# Patient Record
Sex: FEMALE
[De-identification: names, ages, dates, MRNs, and addresses within clinical notes are randomized per-mention and may not be internally consistent; named-entity substitution may affect disease eponyms.]

---

## 2023-10-19 PROBLEM — Z00.00 ENCOUNTER FOR PREVENTIVE HEALTH EXAMINATION: Status: ACTIVE | Noted: 2023-10-19

## 2023-10-26 ENCOUNTER — APPOINTMENT (OUTPATIENT)
Dept: ORTHOPEDIC SURGERY | Facility: CLINIC | Age: 58
End: 2023-10-26
Payer: MEDICAID

## 2023-10-26 VITALS
WEIGHT: 165 LBS | HEART RATE: 84 BPM | SYSTOLIC BLOOD PRESSURE: 145 MMHG | BODY MASS INDEX: 26.52 KG/M2 | OXYGEN SATURATION: 98 % | DIASTOLIC BLOOD PRESSURE: 89 MMHG | HEIGHT: 66 IN

## 2023-10-26 DIAGNOSIS — Z86.39 PERSONAL HISTORY OF OTHER ENDOCRINE, NUTRITIONAL AND METABOLIC DISEASE: ICD-10-CM

## 2023-10-26 DIAGNOSIS — Z72.3 LACK OF PHYSICAL EXERCISE: ICD-10-CM

## 2023-10-26 DIAGNOSIS — Z78.9 OTHER SPECIFIED HEALTH STATUS: ICD-10-CM

## 2023-10-26 DIAGNOSIS — Z87.39 PERSONAL HISTORY OF OTHER DISEASES OF THE MUSCULOSKELETAL SYSTEM AND CONNECTIVE TISSUE: ICD-10-CM

## 2023-10-26 PROCEDURE — 72020 X-RAY EXAM OF SPINE 1 VIEW: CPT

## 2023-10-26 PROCEDURE — 73560 X-RAY EXAM OF KNEE 1 OR 2: CPT | Mod: 50

## 2023-10-26 PROCEDURE — 20610 DRAIN/INJ JOINT/BURSA W/O US: CPT | Mod: 50

## 2023-10-26 PROCEDURE — 73564 X-RAY EXAM KNEE 4 OR MORE: CPT | Mod: 50

## 2023-10-26 PROCEDURE — 99204 OFFICE O/P NEW MOD 45 MIN: CPT | Mod: 25

## 2023-10-26 PROCEDURE — 73030 X-RAY EXAM OF SHOULDER: CPT | Mod: 50

## 2023-10-28 LAB
ERYTHROCYTE [SEDIMENTATION RATE] IN BLOOD BY WESTERGREN METHOD: 16 MM/HR
RHEUMATOID FACT SER QL: <10 IU/ML

## 2023-10-31 LAB
ANA PAT FLD IF-IMP: ABNORMAL
ANA SER IF-ACNC: ABNORMAL

## 2023-11-30 ENCOUNTER — APPOINTMENT (OUTPATIENT)
Dept: ORTHOPEDIC SURGERY | Facility: CLINIC | Age: 58
End: 2023-11-30
Payer: MEDICAID

## 2023-11-30 DIAGNOSIS — M54.12 RADICULOPATHY, CERVICAL REGION: ICD-10-CM

## 2023-11-30 PROCEDURE — 99213 OFFICE O/P EST LOW 20 MIN: CPT

## 2023-12-28 ENCOUNTER — APPOINTMENT (OUTPATIENT)
Dept: ORTHOPEDIC SURGERY | Facility: CLINIC | Age: 58
End: 2023-12-28
Payer: MEDICAID

## 2023-12-28 DIAGNOSIS — M19.012 PRIMARY OSTEOARTHRITIS, RIGHT SHOULDER: ICD-10-CM

## 2023-12-28 DIAGNOSIS — M19.011 PRIMARY OSTEOARTHRITIS, RIGHT SHOULDER: ICD-10-CM

## 2023-12-28 PROCEDURE — 99213 OFFICE O/P EST LOW 20 MIN: CPT

## 2023-12-28 NOTE — PHYSICAL EXAM
[de-identified] : MRI is read today by me and clkearly shows 2-3 tendon massive rotator cugfftears with head elevation and atrophy. These are unreapiarable tears with some cuff arthropathy.

## 2023-12-28 NOTE — DISCUSSION/SUMMARY
[de-identified] : We had a long discussion with the patient through her nephew .  SHe ahs unreapirable RC and Shoulder OA . She needs RTSA as surgiocal option. HSe is scared of surgery and would like to avoid it.  I gave her one more cortisone injection as this is the only injectable Medicaid will cover.  I have also impressed upon her with her multiple joint arthralgias and OA throughout and positive MELVIN she may have Lupus nd should see our Rheumatologist to consider non surgical pharmacologic coverage for her pain and discomfort.  F/U prn

## 2023-12-28 NOTE — HISTORY OF PRESENT ILLNESS
[de-identified] : Ms Smith returns kness are aching but improved. Right shoulder had done well after injection but now pain has returned. SHe never saw the Rheumatologist yet and as we know she has a positive MELVIN with multiple joint arthralgias and so I would like to expedite that.  MRI today is reviewed.

## 2024-01-30 ENCOUNTER — APPOINTMENT (OUTPATIENT)
Dept: ORTHOPEDIC SURGERY | Facility: CLINIC | Age: 59
End: 2024-01-30
Payer: MEDICAID

## 2024-01-30 VITALS — WEIGHT: 165 LBS | HEIGHT: 66 IN | BODY MASS INDEX: 26.52 KG/M2

## 2024-01-30 DIAGNOSIS — M75.101 UNSPECIFIED ROTATOR CUFF TEAR OR RUPTURE OF RIGHT SHOULDER, NOT SPECIFIED AS TRAUMATIC: ICD-10-CM

## 2024-01-30 PROCEDURE — 99204 OFFICE O/P NEW MOD 45 MIN: CPT

## 2024-01-30 RX ORDER — MELOXICAM 15 MG/1
15 TABLET ORAL DAILY
Qty: 30 | Refills: 2 | Status: DISCONTINUED | COMMUNITY
Start: 2023-10-26 | End: 2024-01-30

## 2024-03-01 ENCOUNTER — LABORATORY RESULT (OUTPATIENT)
Age: 59
End: 2024-03-01

## 2024-03-01 ENCOUNTER — APPOINTMENT (OUTPATIENT)
Dept: RHEUMATOLOGY | Facility: CLINIC | Age: 59
End: 2024-03-01
Payer: MEDICAID

## 2024-03-01 VITALS
OXYGEN SATURATION: 96 % | HEART RATE: 60 BPM | BODY MASS INDEX: 27 KG/M2 | SYSTOLIC BLOOD PRESSURE: 132 MMHG | WEIGHT: 168 LBS | TEMPERATURE: 97.9 F | HEIGHT: 66 IN | DIASTOLIC BLOOD PRESSURE: 69 MMHG

## 2024-03-01 DIAGNOSIS — M17.0 BILATERAL PRIMARY OSTEOARTHRITIS OF KNEE: ICD-10-CM

## 2024-03-01 DIAGNOSIS — M32.9 SYSTEMIC LUPUS ERYTHEMATOSUS, UNSPECIFIED: ICD-10-CM

## 2024-03-01 DIAGNOSIS — M47.812 SPONDYLOSIS W/OUT MYELOPATHY OR RADICULOPATHY, CERVICAL REGION: ICD-10-CM

## 2024-03-01 DIAGNOSIS — M71.40 CALCIUM DEPOSIT IN BURSA, UNSPECIFIED SITE: ICD-10-CM

## 2024-03-01 DIAGNOSIS — M75.121 COMPLETE ROTATOR CUFF TEAR OR RUPTURE OF RIGHT SHOULDER, NOT SPECIFIED AS TRAUMATIC: ICD-10-CM

## 2024-03-01 PROCEDURE — 99204 OFFICE O/P NEW MOD 45 MIN: CPT | Mod: 25

## 2024-03-03 LAB
ALBUMIN SERPL ELPH-MCNC: 4.8 G/DL
ALP BLD-CCNC: 70 U/L
ALT SERPL-CCNC: 13 U/L
ANION GAP SERPL CALC-SCNC: 13 MMOL/L
AST SERPL-CCNC: 15 U/L
BASOPHILS # BLD AUTO: 0.03 K/UL
BASOPHILS NFR BLD AUTO: 0.5 %
BILIRUB SERPL-MCNC: 0.2 MG/DL
BUN SERPL-MCNC: 16 MG/DL
CALCIUM SERPL-MCNC: 10 MG/DL
CALCIUM SERPL-MCNC: 10 MG/DL
CCP AB SER IA-ACNC: <8 UNITS
CHLORIDE SERPL-SCNC: 101 MMOL/L
CO2 SERPL-SCNC: 27 MMOL/L
CREAT SERPL-MCNC: 0.61 MG/DL
EGFR: 103 ML/MIN/1.73M2
EOSINOPHIL # BLD AUTO: 0.07 K/UL
EOSINOPHIL NFR BLD AUTO: 1.1 %
GLUCOSE SERPL-MCNC: 104 MG/DL
HCT VFR BLD CALC: 37.4 %
HGB BLD-MCNC: 12 G/DL
IMM GRANULOCYTES NFR BLD AUTO: 0.2 %
LYMPHOCYTES # BLD AUTO: 2.3 K/UL
LYMPHOCYTES NFR BLD AUTO: 37.7 %
MAGNESIUM SERPL-MCNC: 2.2 MG/DL
MAN DIFF?: NORMAL
MCHC RBC-ENTMCNC: 28 PG
MCHC RBC-ENTMCNC: 32.1 GM/DL
MCV RBC AUTO: 87.4 FL
MONOCYTES # BLD AUTO: 0.61 K/UL
MONOCYTES NFR BLD AUTO: 10 %
NEUTROPHILS # BLD AUTO: 3.08 K/UL
NEUTROPHILS NFR BLD AUTO: 50.5 %
PARATHYROID HORMONE INTACT: 61 PG/ML
PHOSPHATE SERPL-MCNC: 3.3 MG/DL
PLATELET # BLD AUTO: 279 K/UL
POTASSIUM SERPL-SCNC: 4.5 MMOL/L
PROT SERPL-MCNC: 7.3 G/DL
RBC # BLD: 4.28 M/UL
RBC # FLD: 13.2 %
RF+CCP IGG SER-IMP: NEGATIVE
SODIUM SERPL-SCNC: 141 MMOL/L
WBC # FLD AUTO: 6.1 K/UL

## 2024-03-04 NOTE — PHYSICAL EXAM
[General Appearance - Alert] : alert [General Appearance - In No Acute Distress] : in no acute distress [General Appearance - Well Nourished] : well nourished [General Appearance - Well Developed] : well developed [General Appearance - Well-Appearing] : healthy appearing [Sclera] : the sclera and conjunctiva were normal [Respiration, Rhythm And Depth] : normal respiratory rhythm and effort [Exaggerated Use Of Accessory Muscles For Inspiration] : no accessory muscle use [Edema] : there was no peripheral edema [Abnormal Walk] : normal gait [Nail Clubbing] : no clubbing  or cyanosis of the fingernails [Musculoskeletal - Swelling] : no joint swelling seen [] : no rash [Oriented To Time, Place, And Person] : oriented to person, place, and time [Impaired Insight] : insight and judgment were intact [Affect] : the affect was normal [Examination Of The Oral Cavity] : the lips and gums were normal [Auscultation Breath Sounds / Voice Sounds] : lungs were clear to auscultation bilaterally [FreeTextEntry1] : Mild decrease in ROM of the right shoulder.  Tenderness over the right lateral thigh with preserved ROM.  Negative straight leg raises bilaterally.  Good handgrip bilaterally.  No active synovitis note.

## 2024-03-04 NOTE — ASSESSMENT
[FreeTextEntry1] : 58 year old woman referred for rheumatology evaluation. Patient reports history of arthritis with joint pains for many years. At this time patient with pain in hands, knees, and right lateral thigh.  Patient with injury in right shoulder post fall, evaluated orthopedics with MRI of the right shoulder with RTC tear for the supraspinatus and infraspinatus tendons, although with preserved function and minimal pain.  Patient evaluated by shoulder specialist and recommended to continue with conservative management at this time without plans for surgery.  Patient previously started physical therapy though has discontinued currently due to lack of time. Patient received injection in right shoulder and knee with some benefit at that time though pain has returned. To manage pain, patient takes Tylenol daily with benefit and as well as icy cold rollers. Reviewed previous MRI shoulder reviewed, x-ray hips 2/27 reviewed with calcium deposit in soft tissue of right hip, awaiting results from further imaging of the MRI of the lumbar spine. Patient will follow up imaging with orthopedist as well.  Patient noted to have positive MELVIN on recent blood tests, at this time, patient does not meet criteria for an underlying connective tissue disease such as SLE, will repeat MELVIN with reflex to serologies today.  given calcinosis, will also check labs including PTH, calcium, magnesium and phosphorus today as well in addition HLAB27 and anti CCP. Further management pending results.

## 2024-03-04 NOTE — ADDENDUM
[FreeTextEntry1] : I, Shakeel Perea, documented this note as a scribe on behalf of Dr. Ebonie Torres MD on 03/01/2024.

## 2024-03-04 NOTE — REVIEW OF SYSTEMS
[Negative] : Heme/Lymph [Joint Pain] : joint pain [FreeTextEntry9] : pain in right shoulder, hands, knees, right hip

## 2024-03-04 NOTE — HISTORY OF PRESENT ILLNESS
[FreeTextEntry1] : March 1, 2024 58 year old woman referred for initial evaluation. Referred by Dr. Lindo in orthopedics Accompanied by daughter who is translating as per patient's request Patient reporys history of arthritis Reports pain in body, mostly joints for a few years Reports pain with , predominantly right hand Pain with extension of left knee Pain in back of neck  Patient with pain in right shoulder post fall, eval by Dr. Flores, no plans for surgery at this time Takes Tylenol four times a day with some benefit Uses ice cold gel rollers with some benefit Received injection in shoulders and knee from Dr. Lindo, felt relief at that time but pain returned Started PT but discontinued due to having no time Reports previous injections in hip as well No known drug allergies  Reviewed MRI shoulder MRI on 2/27, awaiting results X-rays of hips two days ago; of note calcium deposit in soft tissue of right hip No other medical issues No known history of SLE Patient recently came to the [place]

## 2024-03-04 NOTE — END OF VISIT
[Time Spent: ___ minutes] : I have spent [unfilled] minutes of time on the encounter. [FreeTextEntry3] : All medical record entries made by the Scribe were at my, Dr. Ebonie Torres MD, direction and personally dictated by me on 03/01/2024. I have reviewed the chart and agree that the record accurately reflects my personal performance of the history, physical exam, assessment and plan. I have also personally directed, reviewed, and agreed with the chart.

## 2024-03-04 NOTE — DATA REVIEWED
[FreeTextEntry1] : 	 EXAM:  MRI RIGHT SHOULDER WITHOUT CONTRAST  HISTORY:  58-year-old female, pain and decreased range of motion.  TECHNIQUE:  An MRI examination of the right shoulder was performed using oblique coronal proton density and T2 fat suppression, oblique sagittal T1 and  FSE T2 fat suppression, and axial T2 fat suppression sequences.   FINDINGS:    Bones The osseous structures comprising the right shoulder demonstrate normal marrow signal.   AC joint There is mild arthrosis in the acromioclavicular joint.  Rotator cuff There is a massive chronic-appearing full-thickness tear of the rotator cuff involving the supraspinatus and infraspinatus tendons in their entirety, and also extending to involve much of the subscapularis tendon attachment. The supraspinatus and infraspinatus tendons are torn off of the humerus, and markedly retracted to the level of the glenoid. There is at least moderate fatty atrophy of both corresponding muscles. There is a large delaminating articular sided partial tear of the subscapularis tendon, extending into the transverse ligament which covers the bicipital groove. The upper half of the subscapularis muscle is replaced with fatty atrophy as well. The teres minor tendon remains intact.  Long biceps The long biceps tendon is dislocated out of the bicipital groove, tracking anterior to the lesser tuberosity. There is high-grade partial tearing of the proximal (horizontal) component of the long biceps, but a few thin strands of tendon appear to remain anchored onto the superior glenoid.  Glenohumeral joint, labrum, capsule The humeral head is high riding, nearly coming contact with the undersurface of the acromion (which appears eroded and remodeled). There is moderate degenerative arthrosis in the glenohumeral joint, with thinning and irregularity of articular cartilage, marginal spur development on the humerus. There is circumferential attenuation, degeneration of and fraying of the glenoid labrum. A small glenohumeral joint effusion is present, communicating freely with additional fluid in the overlying bursa due to the massive rotator cuff defect. There is no adhesive capsulitis.  IMPRESSION:   1. Mild AC joint arthrosis. 2. Massive chronic-appearing full-thickness tear of the rotator cuff involving the supraspinatus and infraspinatus tendons in their entirety, and a large portion of the subscapularis tendon as well. There is moderate to severe fatty atrophy in the supraspinatus and infraspinatus muscles, as well as the upper half of the subscapularis muscle. 3. The transverse ligament is ruptured, and the long biceps tendon is dislocated out of the humeral bicipital groove; there is high-grade partial tearing of the proximal (horizontal) portion of the long biceps tendon. 4. High riding humeral head, moderate glenohumeral arthrosis, with degeneration/fraying of much of the glenoid labrum.   Thank you for the opportunity to participate in the care of this patient.     EVE RUBIN MD  - Electronically Signed: 12- 11:45 AM  Physician to Physician Direct Line is: [phone]  	 EXAM:  X-RAY BILATERAL HIPS 3-4 VIEWS  HISTORY:  Bilateral hip pain.  FINDINGS:    Intact symmetric hip joint spaces. Intact pubic symphysis and SI joints.  Normal bony mineralization.  There are chronic appearing foci soft tissue callus cages lateral to the right greater trochanter. Clinical correlation for possible symptoms of right trochanteric calcific tendinitis/bursitis.  IMPRESSION: As above.  Thank you for the opportunity to participate in the care of this patient.     RAJEEV ESPINOSA MD  - Electronically Signed: 02- 12:21 PM  Physician to Physician Direct Line is: [phone]

## 2024-03-07 LAB — HLA-B27 QL NAA+PROBE: NORMAL

## 2024-03-18 LAB
ANA PAT FLD IF-IMP: ABNORMAL
ANACR T: ABNORMAL

## 2024-03-22 ENCOUNTER — APPOINTMENT (OUTPATIENT)
Dept: RHEUMATOLOGY | Facility: CLINIC | Age: 59
End: 2024-03-22
Payer: MEDICAID

## 2024-03-22 DIAGNOSIS — R76.8 OTHER SPECIFIED ABNORMAL IMMUNOLOGICAL FINDINGS IN SERUM: ICD-10-CM

## 2024-03-22 DIAGNOSIS — M25.50 PAIN IN UNSPECIFIED JOINT: ICD-10-CM

## 2024-03-22 PROCEDURE — 99442: CPT

## 2024-03-24 PROBLEM — R76.8 ANA POSITIVE: Status: ACTIVE | Noted: 2024-03-01

## 2024-03-24 PROBLEM — M25.50 ARTHRALGIA OF MULTIPLE JOINTS: Status: ACTIVE | Noted: 2023-10-26

## 2024-03-24 NOTE — ASSESSMENT
[FreeTextEntry1] : 58 year old woman returns for rheumatology follow up via telephonic visit. Patient reports history of arthritis with joint pains for many years.  Patient with injury in right shoulder post fall, evaluated orthopedics with MRI of the right shoulder with RTC tear for the supraspinatus and infraspinatus tendons, although with preserved function and minimal pain.  Patient evaluated by shoulder specialist and recommended to continue with conservative management at this time without plans for surgery.  Patient previously started physical therapy though has discontinued currently due to lack of time. Patient received injection in right shoulder and knee with some benefit at that time though pain has returned. To manage pain, patient takes Tylenol daily with benefit and as well as icy cold rollers. Reviewed previous MRI shoulder as well as x-ray hips 2/27 reviewed with calcium deposit in soft tissue of right hip, awaiting results from further imaging of the MRI of the lumbar spine (not available to me at this time). Patient will follow up imaging with orthopedist as well.  Patient noted to have positive MELVIN on recent blood tests, at this time, patient does not meet criteria for an underlying connective tissue disease, repeat labs noted low positive anti centromere antibody without clinical criteria for CREST syndrome. Patient will follow up with orthopedist, will continue to monitor, follow up with rheumatology in 4-6 months or sooner as needed.

## 2024-03-24 NOTE — DATA REVIEWED
[FreeTextEntry1] : 	 EXAM:  MRI RIGHT SHOULDER WITHOUT CONTRAST  HISTORY:  58-year-old female, pain and decreased range of motion.  TECHNIQUE:  An MRI examination of the right shoulder was performed using oblique coronal proton density and T2 fat suppression, oblique sagittal T1 and  FSE T2 fat suppression, and axial T2 fat suppression sequences.   FINDINGS:    Bones The osseous structures comprising the right shoulder demonstrate normal marrow signal.   AC joint There is mild arthrosis in the acromioclavicular joint.  Rotator cuff There is a massive chronic-appearing full-thickness tear of the rotator cuff involving the supraspinatus and infraspinatus tendons in their entirety, and also extending to involve much of the subscapularis tendon attachment. The supraspinatus and infraspinatus tendons are torn off of the humerus, and markedly retracted to the level of the glenoid. There is at least moderate fatty atrophy of both corresponding muscles. There is a large delaminating articular sided partial tear of the subscapularis tendon, extending into the transverse ligament which covers the bicipital groove. The upper half of the subscapularis muscle is replaced with fatty atrophy as well. The teres minor tendon remains intact.  Long biceps The long biceps tendon is dislocated out of the bicipital groove, tracking anterior to the lesser tuberosity. There is high-grade partial tearing of the proximal (horizontal) component of the long biceps, but a few thin strands of tendon appear to remain anchored onto the superior glenoid.  Glenohumeral joint, labrum, capsule The humeral head is high riding, nearly coming contact with the undersurface of the acromion (which appears eroded and remodeled). There is moderate degenerative arthrosis in the glenohumeral joint, with thinning and irregularity of articular cartilage, marginal spur development on the humerus. There is circumferential attenuation, degeneration of and fraying of the glenoid labrum. A small glenohumeral joint effusion is present, communicating freely with additional fluid in the overlying bursa due to the massive rotator cuff defect. There is no adhesive capsulitis.  IMPRESSION:   1. Mild AC joint arthrosis. 2. Massive chronic-appearing full-thickness tear of the rotator cuff involving the supraspinatus and infraspinatus tendons in their entirety, and a large portion of the subscapularis tendon as well. There is moderate to severe fatty atrophy in the supraspinatus and infraspinatus muscles, as well as the upper half of the subscapularis muscle. 3. The transverse ligament is ruptured, and the long biceps tendon is dislocated out of the humeral bicipital groove; there is high-grade partial tearing of the proximal (horizontal) portion of the long biceps tendon. 4. High riding humeral head, moderate glenohumeral arthrosis, with degeneration/fraying of much of the glenoid labrum.   Thank you for the opportunity to participate in the care of this patient.     EVE RUBIN MD  - Electronically Signed: 12- 11:45 AM  Physician to Physician Direct Line is: [phone]  	 EXAM:  X-RAY BILATERAL HIPS 3-4 VIEWS  HISTORY:  Bilateral hip pain.  FINDINGS:    Intact symmetric hip joint spaces. Intact pubic symphysis and SI joints.  Normal bony mineralization.  There are chronic appearing foci soft tissue callus cages lateral to the right greater trochanter. Clinical correlation for possible symptoms of right trochanteric calcific tendinitis/bursitis.  IMPRESSION: As above.  Thank you for the opportunity to participate in the care of this patient.     RAJEEV ESPINOSA MD  - Electronically Signed: 02- 12:21 PM  Physician to Physician Direct Line is: [phone]

## 2024-03-24 NOTE — HISTORY OF PRESENT ILLNESS
[FreeTextEntry1] : March 22, 2024 Patient returns for follow up.  Visit translated by patient's daughter at patient request. Verbal consent given on 03/24/2024 at 14:52 by RUPALI PARRA for telehealth visit. [place] is located in [place], provider is located in [place].  Patient overall feeling well. Reviewed lab results with patient and daughter. No Raynaud's, no telangiectasias, no dysphagia.  March 1, 2024 58 year old woman referred for initial evaluation. Referred by Dr. Lindo in orthopedics Accompanied by daughter who is translating as per patient's request Patient reporys history of arthritis Reports pain in body, mostly joints for a few years Reports pain with , predominantly right hand Pain with extension of left knee Pain in back of neck  Patient with pain in right shoulder post fall, eval by Dr. Flores, no plans for surgery at this time Takes Tylenol four times a day with some benefit Uses ice cold gel rollers with some benefit Received injection in shoulders and knee from Dr. Lindo, felt relief at that time but pain returned Started PT but discontinued due to having no time Reports previous injections in hip as well No known drug allergies  Reviewed MRI shoulder MRI on 2/27, awaiting results X-rays of hips two days ago; of note calcium deposit in soft tissue of right hip No other medical issues No known history of SLE Patient recently came [place] the [place]

## 2024-10-30 ENCOUNTER — NON-APPOINTMENT (OUTPATIENT)
Age: 59
End: 2024-10-30

## 2024-10-30 ENCOUNTER — APPOINTMENT (OUTPATIENT)
Dept: RHEUMATOLOGY | Facility: CLINIC | Age: 59
End: 2024-10-30
Payer: MEDICAID

## 2024-10-30 VITALS
DIASTOLIC BLOOD PRESSURE: 76 MMHG | WEIGHT: 171 LBS | OXYGEN SATURATION: 94 % | TEMPERATURE: 97.3 F | HEART RATE: 77 BPM | SYSTOLIC BLOOD PRESSURE: 123 MMHG | BODY MASS INDEX: 27.48 KG/M2 | HEIGHT: 66 IN

## 2024-10-30 DIAGNOSIS — M25.50 PAIN IN UNSPECIFIED JOINT: ICD-10-CM

## 2024-10-30 DIAGNOSIS — M81.0 AGE-RELATED OSTEOPOROSIS W/OUT CURRENT PATHOLOGICAL FRACTURE: ICD-10-CM

## 2024-10-30 PROCEDURE — G2211 COMPLEX E/M VISIT ADD ON: CPT | Mod: NC

## 2024-10-30 PROCEDURE — 99213 OFFICE O/P EST LOW 20 MIN: CPT

## 2024-10-30 RX ORDER — CYCLOBENZAPRINE HCL
CRYSTALS MISCELLANEOUS
Refills: 0 | Status: ACTIVE | COMMUNITY

## 2024-10-30 RX ORDER — MELOXICAM 10 MG/1
CAPSULE ORAL
Refills: 0 | Status: ACTIVE | COMMUNITY

## 2024-10-31 PROBLEM — M81.0 OSTEOPOROSIS, UNSPECIFIED OSTEOPOROSIS TYPE, UNSPECIFIED PATHOLOGICAL FRACTURE PRESENCE: Status: ACTIVE | Noted: 2023-10-26

## 2024-10-31 LAB
25(OH)D3 SERPL-MCNC: 41.6 NG/ML
RHEUMATOID FACT SER QL: <10 IU/ML

## 2025-03-05 ENCOUNTER — NON-APPOINTMENT (OUTPATIENT)
Age: 60
End: 2025-03-05

## 2025-03-05 ENCOUNTER — APPOINTMENT (OUTPATIENT)
Dept: RHEUMATOLOGY | Facility: CLINIC | Age: 60
End: 2025-03-05
Payer: MEDICAID

## 2025-03-05 VITALS
DIASTOLIC BLOOD PRESSURE: 75 MMHG | HEART RATE: 69 BPM | HEIGHT: 66 IN | WEIGHT: 176 LBS | SYSTOLIC BLOOD PRESSURE: 127 MMHG | BODY MASS INDEX: 28.28 KG/M2 | TEMPERATURE: 97.7 F | OXYGEN SATURATION: 97 %

## 2025-03-05 DIAGNOSIS — M25.50 PAIN IN UNSPECIFIED JOINT: ICD-10-CM

## 2025-03-05 DIAGNOSIS — M81.0 AGE-RELATED OSTEOPOROSIS W/OUT CURRENT PATHOLOGICAL FRACTURE: ICD-10-CM

## 2025-03-05 PROCEDURE — 99214 OFFICE O/P EST MOD 30 MIN: CPT

## 2025-03-05 PROCEDURE — G2211 COMPLEX E/M VISIT ADD ON: CPT | Mod: NC

## 2025-03-05 RX ORDER — CALCIUM CARBONATE/VITAMIN D3 600 MG-10
600-10 TABLET ORAL DAILY
Qty: 90 | Refills: 3 | Status: ACTIVE | COMMUNITY
Start: 2025-03-05 | End: 1900-01-01

## 2025-03-05 RX ORDER — ALENDRONATE SODIUM 70 MG/1
70 TABLET ORAL
Qty: 13 | Refills: 3 | Status: ACTIVE | COMMUNITY
Start: 2025-03-05 | End: 1900-01-01

## 2025-06-04 ENCOUNTER — APPOINTMENT (OUTPATIENT)
Dept: RHEUMATOLOGY | Facility: CLINIC | Age: 60
End: 2025-06-04

## 2025-06-23 ENCOUNTER — APPOINTMENT (OUTPATIENT)
Dept: RHEUMATOLOGY | Facility: CLINIC | Age: 60
End: 2025-06-23
Payer: MEDICAID

## 2025-06-23 VITALS
SYSTOLIC BLOOD PRESSURE: 112 MMHG | BODY MASS INDEX: 27.64 KG/M2 | WEIGHT: 172 LBS | OXYGEN SATURATION: 93 % | HEIGHT: 66 IN | TEMPERATURE: 97.7 F | DIASTOLIC BLOOD PRESSURE: 62 MMHG | HEART RATE: 76 BPM

## 2025-06-23 PROCEDURE — 99214 OFFICE O/P EST MOD 30 MIN: CPT

## 2025-06-23 PROCEDURE — G2211 COMPLEX E/M VISIT ADD ON: CPT | Mod: NC

## 2025-06-23 RX ORDER — OMEPRAZOLE 40 MG/1
CAPSULE, DELAYED RELEASE ORAL
Refills: 0 | Status: ACTIVE | COMMUNITY

## 2025-06-24 LAB
25(OH)D3 SERPL-MCNC: 27.1 NG/ML
ALBUMIN SERPL ELPH-MCNC: 4.8 G/DL
ALP BLD-CCNC: 68 U/L
ALT SERPL-CCNC: 18 U/L
ANION GAP SERPL CALC-SCNC: 16 MMOL/L
AST SERPL-CCNC: 19 U/L
BILIRUB SERPL-MCNC: 0.3 MG/DL
BUN SERPL-MCNC: 12 MG/DL
CALCIUM SERPL-MCNC: 9.6 MG/DL
CHLORIDE SERPL-SCNC: 102 MMOL/L
CO2 SERPL-SCNC: 24 MMOL/L
CREAT SERPL-MCNC: 0.57 MG/DL
EGFRCR SERPLBLD CKD-EPI 2021: 103 ML/MIN/1.73M2
GLUCOSE SERPL-MCNC: 121 MG/DL
POTASSIUM SERPL-SCNC: 4.6 MMOL/L
PROT SERPL-MCNC: 7.3 G/DL
SODIUM SERPL-SCNC: 141 MMOL/L

## 2025-06-24 RX ORDER — UBIDECARENONE/VIT E ACET 100MG-5
25 MCG CAPSULE ORAL
Qty: 90 | Refills: 1 | Status: ACTIVE | COMMUNITY
Start: 2025-06-24 | End: 1900-01-01